# Patient Record
Sex: FEMALE | Race: BLACK OR AFRICAN AMERICAN | Employment: FULL TIME | URBAN - METROPOLITAN AREA
[De-identification: names, ages, dates, MRNs, and addresses within clinical notes are randomized per-mention and may not be internally consistent; named-entity substitution may affect disease eponyms.]

---

## 2021-10-04 ENCOUNTER — HOSPITAL ENCOUNTER (OUTPATIENT)
Age: 41
Discharge: HOME | End: 2021-10-04
Payer: COMMERCIAL

## 2021-10-04 VITALS
OXYGEN SATURATION: 98 % | SYSTOLIC BLOOD PRESSURE: 117 MMHG | HEART RATE: 93 BPM | TEMPERATURE: 98 F | RESPIRATION RATE: 18 BRPM | DIASTOLIC BLOOD PRESSURE: 80 MMHG

## 2021-10-04 DIAGNOSIS — N93.8 DYSFUNCTIONAL UTERINE BLEEDING: Primary | ICD-10-CM

## 2021-10-04 DIAGNOSIS — N93.9 VAGINAL BLEEDING: ICD-10-CM

## 2021-10-04 PROCEDURE — 81025 URINE PREGNANCY TEST: CPT | Performed by: NURSE PRACTITIONER

## 2021-10-04 PROCEDURE — 36415 COLL VENOUS BLD VENIPUNCTURE: CPT | Performed by: NURSE PRACTITIONER

## 2021-10-04 PROCEDURE — 99204 OFFICE O/P NEW MOD 45 MIN: CPT | Performed by: NURSE PRACTITIONER

## 2021-10-04 PROCEDURE — 85025 COMPLETE CBC W/AUTO DIFF WBC: CPT | Performed by: NURSE PRACTITIONER

## 2021-10-04 NOTE — ED PROVIDER NOTES
Patient Seen in: Immediate [facility]      History   Patient presents with:  Nathanael    Stated Complaint: vaginal bleeding    Subjective:   Well-appearing 22-year-old female presents with complaints of vaginal bleeding since this past Friday.  Patient c Therapist tried update Griselda, at [phone], at [facility] but was informed that Griselda is not working with them anymore. Therapist was able to leave a message for Nat, additional support at [facility], to update her about pt's hospitalization and aftercare plan. Therapist met w/ pt for a motivational interview and to assist pt in creating an AODA Recovery Plan.  Pt able to identify several coping skills and plan for maintaining sobriety post discharge. Plan also includes supportive resources. AODA Recovery Plan on AVS-see AVS for details.  Therapist also assisted pt in creating a crisis survival plan. Pt able to identify adaptive coping skills and distracting activities to help keep self safe post discharge. Plan also includes supportive resources. Crisis survival plan on AVS-see AVS for details.  She denied that she needed listings of local AODA support groups and had already gathered materials that she needed. Dion Jackson, LPC, CSAC, CS-IT     within normal limits for this patient. General: Patient is awake and alert, oriented to person, place and time. Pt appears non-toxic. HEENT: Head is normocephalic, atraumatic. Nonicteric sclera, no conjunctival injection.  No facial droop or slurred Negative  Comment: (Reviewed)            MDM   Patient is well-appearing. No acute distress. Urine UCG negative. Due to blood in urine, UA was not able to be processed here, lab order placed.  Patient declined  exam.  Patient communicates she has never

## 2021-10-04 NOTE — ED INITIAL ASSESSMENT (HPI)
Pt here with complaints of heavy menstrual period and clots that started 2 days, pt states last months period she experience a lot of abd cramping but not this month, pt denies any fevers or pelvic pain